# Patient Record
Sex: FEMALE | Race: WHITE | NOT HISPANIC OR LATINO | Employment: FULL TIME | URBAN - METROPOLITAN AREA
[De-identification: names, ages, dates, MRNs, and addresses within clinical notes are randomized per-mention and may not be internally consistent; named-entity substitution may affect disease eponyms.]

---

## 2019-03-28 ENCOUNTER — TRANSFERRED RECORDS (OUTPATIENT)
Dept: HEALTH INFORMATION MANAGEMENT | Facility: CLINIC | Age: 39
End: 2019-03-28

## 2019-03-28 ENCOUNTER — PATIENT OUTREACH (OUTPATIENT)
Dept: PLASTIC SURGERY | Facility: CLINIC | Age: 39
End: 2019-03-28

## 2019-03-28 DIAGNOSIS — F64.0 GENDER DYSPHORIA IN ADULT: Primary | ICD-10-CM

## 2019-03-28 NOTE — PROGRESS NOTES
[facility]:  Care Coordination Note     SITUATION   Patient is a 38 year old who is receiving support for:  Clinic Care Coordination - Initial (New Patient requesting vaginoplasty)  .    BACKGROUND     New pt requesting vaginoplasty consultation with Dr. Andersen. Pt currently working with [facility] team, but would like a second opinion regarding surgical outcomes.     ASSESSMENT     Surgery              [facility] Assessment  [facility] ([facility]) Enrollment: Enrolled(Hormones for 3 years prescribed by Dr. Awad at [facility]. )  Patient has a therapist: Yes  Name of therapist: Dagmar Ruiz in [email or web address]  Letter of support #1: Requested  Letter of support #2: Requested(Aime Matamoros at [facility])  Surgery being considered: Yes  Vaginoplasty: Yes    Pt reports no smoking, BMI at 31.3, pt reports working on weight loss.       PLAN          Nursing Interventions:   [facility] program and services discussed with patient. [facility] referral placed. [facility] assessment completed. Process for accessing surgery discussed including: WPATH standards of care, Letters of support, [place] insurance process, surgery scheduling, and approximate timeline. Pt questions answered. Registration completed & reviewed; scheduling process discussed & completed, as necessary.     More than 50% of the time was used to educate patient on medical & surgical process.     Follow-up plan:  Pt to obtain two letters of support from mental health providers and fax to Dr. Candido Andersen's office. Fax # was provided. Pt to sign up for [employer]. Once two letters have been received, pt will be called and scheduled for lower surgery consult.          Adolfo Xie

## 2019-03-29 ENCOUNTER — PATIENT OUTREACH (OUTPATIENT)
Dept: PLASTIC SURGERY | Facility: CLINIC | Age: 39
End: 2019-03-29

## 2019-03-29 NOTE — PROGRESS NOTES
[facility]:  Care Coordination Note     SITUATION   Patient is a 38 year old who is receiving support for:  Clinic Care Coordination - follow up   BACKGROUND      New pt requesting vaginoplasty consultation with Dr. Andersen. Pt currently working with [facility] team, but would like a second opinion regarding surgical outcomes.      ASSESSMENT      Surgery              CGC Assessment  [facility] ([facility]) Enrollment: Enrolled(Hormones for 3 years prescribed by Dr. Awad at [facility]. )  Patient has a therapist: Yes  Name of therapist: Dagmar Ruiz in [employer]  Letter of support #1: Received 3/29/19  Letter of support #2: Requested(Aime Matamoros at [facility])  Surgery being considered: Yes  Vaginoplasty: Yes     Pt reports no smoking, BMI at 31.3, pt reports working on weight loss.         PLAN                    Nursing Interventions:   1st letter of support scanned to chart and routed to trans care coordinator for review.    Follow-up plan:  Will schedule consultation after 2nd letter of support received and approved.

## 2024-04-22 ENCOUNTER — OFFICE VISIT (OUTPATIENT)
Dept: URGENT CARE | Facility: URGENT CARE | Age: 44
End: 2024-04-22
Payer: COMMERCIAL

## 2024-04-22 VITALS
WEIGHT: 215 LBS | TEMPERATURE: 101.4 F | HEART RATE: 128 BPM | OXYGEN SATURATION: 96 % | SYSTOLIC BLOOD PRESSURE: 131 MMHG | DIASTOLIC BLOOD PRESSURE: 87 MMHG

## 2024-04-22 DIAGNOSIS — R11.2 NAUSEA AND VOMITING, UNSPECIFIED VOMITING TYPE: Primary | ICD-10-CM

## 2024-04-22 PROCEDURE — 99203 OFFICE O/P NEW LOW 30 MIN: CPT | Performed by: NURSE PRACTITIONER

## 2024-04-22 RX ORDER — ONDANSETRON 4 MG/1
4 TABLET, ORALLY DISINTEGRATING ORAL EVERY 8 HOURS PRN
Qty: 20 TABLET | Refills: 0 | Status: SHIPPED | OUTPATIENT
Start: 2024-04-22

## 2024-04-22 RX ORDER — ESTRADIOL 2 MG/1
1 TABLET ORAL DAILY
COMMUNITY

## 2024-04-22 RX ORDER — ESCITALOPRAM OXALATE 10 MG/1
15 TABLET ORAL DAILY
COMMUNITY

## 2024-04-22 NOTE — PATIENT INSTRUCTIONS
Take zofran every 8 hours as needed for nausea  Small sips of fluids often.    May take imodium as needed for diarrhea  Try the BRAT diet.    Follow up if the diarrhea persists or worsens in the [facility] room or ER for IV fluids.

## 2024-09-23 ENCOUNTER — APPOINTMENT (OUTPATIENT)
Dept: GENERAL RADIOLOGY | Facility: CLINIC | Age: 44
End: 2024-09-23
Attending: EMERGENCY MEDICINE
Payer: COMMERCIAL

## 2024-09-23 ENCOUNTER — HOSPITAL ENCOUNTER (EMERGENCY)
Facility: CLINIC | Age: 44
Discharge: HOME | End: 2024-09-23
Attending: EMERGENCY MEDICINE | Admitting: EMERGENCY MEDICINE
Payer: COMMERCIAL

## 2024-09-23 VITALS
TEMPERATURE: 97.4 F | HEIGHT: 66 IN | HEART RATE: 66 BPM | SYSTOLIC BLOOD PRESSURE: 131 MMHG | WEIGHT: 215 LBS | DIASTOLIC BLOOD PRESSURE: 93 MMHG | OXYGEN SATURATION: 99 % | RESPIRATION RATE: 17 BRPM | BODY MASS INDEX: 34.55 KG/M2

## 2024-09-23 DIAGNOSIS — R07.9 CHEST PAIN, UNSPECIFIED TYPE: ICD-10-CM

## 2024-09-23 LAB
ALBUMIN SERPL BCG-MCNC: 4.1 G/DL (ref 3.5–5.2)
ALP SERPL-CCNC: 60 U/L (ref 40–150)
ALT SERPL W P-5'-P-CCNC: 39 U/L (ref 0–70)
ANION GAP SERPL CALCULATED.3IONS-SCNC: 11 MMOL/L (ref 7–15)
AST SERPL W P-5'-P-CCNC: 20 U/L (ref 0–45)
ATRIAL RATE - MUSE: 78 BPM
BASOPHILS # BLD AUTO: 0 10E3/UL (ref 0–0.2)
BASOPHILS NFR BLD AUTO: 0 %
BILIRUB DIRECT SERPL-MCNC: <0.2 MG/DL (ref 0–0.3)
BILIRUB SERPL-MCNC: 0.4 MG/DL
BUN SERPL-MCNC: 10.9 MG/DL (ref 6–20)
CALCIUM SERPL-MCNC: 9.4 MG/DL (ref 8.8–10.4)
CHLORIDE SERPL-SCNC: 104 MMOL/L (ref 98–107)
CREAT SERPL-MCNC: 0.74 MG/DL (ref 0.51–1.17)
DIASTOLIC BLOOD PRESSURE - MUSE: NORMAL MMHG
EGFRCR SERPLBLD CKD-EPI 2021: >90 ML/MIN/1.73M2
EOSINOPHIL # BLD AUTO: 0.4 10E3/UL (ref 0–0.7)
EOSINOPHIL NFR BLD AUTO: 6 %
ERYTHROCYTE [DISTWIDTH] IN BLOOD BY AUTOMATED COUNT: 12 % (ref 10–15)
GLUCOSE SERPL-MCNC: 135 MG/DL (ref 70–99)
HCO3 SERPL-SCNC: 24 MMOL/L (ref 22–29)
HCT VFR BLD AUTO: 41.6 % (ref 35–53)
HGB BLD-MCNC: 14.8 G/DL (ref 11.7–17.7)
IMM GRANULOCYTES # BLD: 0 10E3/UL
IMM GRANULOCYTES NFR BLD: 0 %
INTERPRETATION ECG - MUSE: NORMAL
LIPASE SERPL-CCNC: 27 U/L (ref 13–60)
LYMPHOCYTES # BLD AUTO: 2.4 10E3/UL (ref 0.8–5.3)
LYMPHOCYTES NFR BLD AUTO: 35 %
MCH RBC QN AUTO: 32.6 PG (ref 26.5–33)
MCHC RBC AUTO-ENTMCNC: 35.6 G/DL (ref 31.5–36.5)
MCV RBC AUTO: 92 FL (ref 78–100)
MONOCYTES # BLD AUTO: 0.5 10E3/UL (ref 0–1.3)
MONOCYTES NFR BLD AUTO: 7 %
NEUTROPHILS # BLD AUTO: 3.5 10E3/UL (ref 1.6–8.3)
NEUTROPHILS NFR BLD AUTO: 51 %
NRBC # BLD AUTO: 0 10E3/UL
NRBC BLD AUTO-RTO: 0 /100
P AXIS - MUSE: 51 DEGREES
PLATELET # BLD AUTO: 179 10E3/UL (ref 150–450)
POTASSIUM SERPL-SCNC: 3.8 MMOL/L (ref 3.4–5.3)
PR INTERVAL - MUSE: 162 MS
PROT SERPL-MCNC: 6.3 G/DL (ref 6.4–8.3)
QRS DURATION - MUSE: 82 MS
QT - MUSE: 378 MS
QTC - MUSE: 430 MS
R AXIS - MUSE: 73 DEGREES
RBC # BLD AUTO: 4.54 10E6/UL (ref 3.8–5.9)
SODIUM SERPL-SCNC: 139 MMOL/L (ref 135–145)
SYSTOLIC BLOOD PRESSURE - MUSE: NORMAL MMHG
T AXIS - MUSE: 50 DEGREES
TROPONIN T SERPL HS-MCNC: <6 NG/L
VENTRICULAR RATE- MUSE: 78 BPM
WBC # BLD AUTO: 6.9 10E3/UL (ref 4–11)

## 2024-09-23 PROCEDURE — 80053 COMPREHEN METABOLIC PANEL: CPT | Performed by: EMERGENCY MEDICINE

## 2024-09-23 PROCEDURE — 80048 BASIC METABOLIC PNL TOTAL CA: CPT | Performed by: EMERGENCY MEDICINE

## 2024-09-23 PROCEDURE — 85025 COMPLETE CBC W/AUTO DIFF WBC: CPT | Performed by: EMERGENCY MEDICINE

## 2024-09-23 PROCEDURE — 99285 EMERGENCY DEPT VISIT HI MDM: CPT | Mod: 25

## 2024-09-23 PROCEDURE — 71046 X-RAY EXAM CHEST 2 VIEWS: CPT

## 2024-09-23 PROCEDURE — 36415 COLL VENOUS BLD VENIPUNCTURE: CPT | Performed by: EMERGENCY MEDICINE

## 2024-09-23 PROCEDURE — 83690 ASSAY OF LIPASE: CPT | Performed by: EMERGENCY MEDICINE

## 2024-09-23 PROCEDURE — 93005 ELECTROCARDIOGRAM TRACING: CPT

## 2024-09-23 PROCEDURE — 82248 BILIRUBIN DIRECT: CPT | Performed by: EMERGENCY MEDICINE

## 2024-09-23 PROCEDURE — 84484 ASSAY OF TROPONIN QUANT: CPT | Performed by: EMERGENCY MEDICINE

## 2024-09-23 ASSESSMENT — ACTIVITIES OF DAILY LIVING (ADL)
ADLS_ACUITY_SCORE: 35

## 2024-09-23 ASSESSMENT — COLUMBIA-SUICIDE SEVERITY RATING SCALE - C-SSRS
6. HAVE YOU EVER DONE ANYTHING, STARTED TO DO ANYTHING, OR PREPARED TO DO ANYTHING TO END YOUR LIFE?: NO
1. IN THE PAST MONTH, HAVE YOU WISHED YOU WERE DEAD OR WISHED YOU COULD GO TO SLEEP AND NOT WAKE UP?: NO
2. HAVE YOU ACTUALLY HAD ANY THOUGHTS OF KILLING YOURSELF IN THE PAST MONTH?: NO

## 2024-09-23 NOTE — ED PROVIDER NOTES
"  Emergency Department Note      History of Present Illness     Chief Complaint   Chest Pain      HPI   Sepideh Mckeon is a 44 year old adult who presents with chest pain. Last night around 2230 the patient developed dull, central chest pain while sitting in bed along with slight light-headedness. Pain felt similar to indigestion which they get intermittently as it radiated into the upper abdomen. No nausea, shortness of breath, fever, cough, leg swelling or pain.  No recent long travel or surgeries. No History of DVT/PE or ACS. Also feels an intermittent warm sensation on arms and legs.    Independent Historian   None    Past Medical History     Medical History and Problem List   Depression   CRISTIANE on CPAP  Gender dysphoria   Anxiety     Medications   Lexapro   Estradiol     Surgical History   [place] teeth extraction   Vaginoplasty     Physical Exam     Patient Vitals for the past 24 hrs:   BP Temp Temp src Pulse Resp SpO2 Height Weight   09/23/24 0118 (!) 156/99 97.4  F (36.3  C) Oral 78 18 98 % 1.676 m (5' 6\") 97.5 kg (215 lb)     Physical Exam  General: Sitting up in bed  Eyes:  The pupils are equal and round    Conjunctivae and sclerae are normal  ENT:    Atraumatic face  Neck:  Normal range of motion  CV:  Regular rate, regular rhythm     Skin warm and well perfused   Resp:  Non labored breathing on room air    No tachypnea    No cough heard    Lungs clear bilaterally  GI:  Abdomen is soft, there is no rigidity    No distension    No rebound tenderness     No abdominal tenderness  MS:  Normal muscular tone  Skin:  No rash or acute skin lesions noted  Neuro:   Awake, alert.      Speech is normal and fluent.    Face is symmetric.     Moves all extremities equally    SILT on bilateral UE/LE  Psych: Normal affect.  Appropriate interactions.    Diagnostics     Lab Results   Labs Ordered and Resulted from Time of ED Arrival to Time of ED Departure   BASIC METABOLIC PANEL - Abnormal       Result Value    Sodium 139   "    Potassium 3.8      Chloride 104      Carbon Dioxide (CO2) 24      Anion Gap 11      Urea Nitrogen 10.9      Creatinine 0.74      GFR Estimate >90      Calcium 9.4      Glucose 135 (*)    HEPATIC FUNCTION PANEL - Abnormal    Protein Total 6.3 (*)     Albumin 4.1      Bilirubin Total 0.4      Alkaline Phosphatase 60      AST 20      ALT 39      Bilirubin Direct <0.20     TROPONIN T, HIGH SENSITIVITY - Normal    Troponin T, High Sensitivity <6     LIPASE - Normal    Lipase 27     CBC WITH PLATELETS AND DIFFERENTIAL    WBC Count 6.9      RBC Count 4.54      Hemoglobin 14.8      Hematocrit 41.6      MCV 92      MCH 32.6      MCHC 35.6      RDW 12.0      Platelet Count 179      % Neutrophils 51      % Lymphocytes 35      % Monocytes 7      % Eosinophils 6      % Basophils 0      % Immature Granulocytes 0      NRBCs per 100 WBC 0      Absolute Neutrophils 3.5      Absolute Lymphocytes 2.4      Absolute Monocytes 0.5      Absolute Eosinophils 0.4      Absolute Basophils 0.0      Absolute Immature Granulocytes 0.0      Absolute NRBCs 0.0       Imaging   XR Chest 2 Views   Final Result   IMPRESSION: Negative chest.          EKG   ECG taken at 0117, ECG read at 0126  Normal sinus rhythm   Normal ECG  Rate 78 bpm. AL interval 162 ms. QRS duration 82 ms. QT/QTc 378/430 ms. P-R-T axes 51 73 50.    Independent Interpretation   CXR: No pneumothorax.    ED Course      Discussion of Management   None    ED Course   ED Course as of 09/23/24 0427   Mon Sep 23, 2024   0204 I initially assessed the patient and obtained the above history and physical exam.    0312 I rechecked the patient        Additional Documentation  None    Medical Decision Making / Diagnosis     JASS Mckeon is a 44 year old adult who presents with chest pain.  The work up in the Emergency Department is negative.  The differential diagnosis of chest pain is broad and includes life threatening etiologies such as Acute coronary syndrome, Myocardial  infarction, Pulmonary Embolism, and Acute Aortic Dissection.  Other causes may include pneumonia, pneumothorax, pericarditis, pleurisy, esophageal spasm, biliary colic, pancreatitis.  No serious etiology for the chest pain was detected today during this visit.  Doubt PE based on symptoms and do not think further workup or testing is indicated emergency department for this.  Doubt dissection with narrow mediastinum on chest x-ray, pain resolved in the emergency department and no radiation to the back.  Patient's discomfort was resolved in the emergency department.  Troponin is undetectable and do not think second troponin is indicated.  Chest x-ray is unremarkable.    Close follow up with primary care is indicated should the pain continue, as further work up may be performed.    Disposition   The patient was discharged.     Diagnosis     ICD-10-CM    1. Chest pain, unspecified type  R07.9          Scribe Disclosure:  I, Timbo Zepeda, am serving as a scribe at 2:09 AM on 9/23/2024 to document services personally performed by Laura Franco MD based on my observations and the provider's statements to me.        Laura Franco MD  09/23/24 5100

## 2024-09-23 NOTE — ED TRIAGE NOTES
"Pt presents to triage with C/O chest discomfort, lightheadedness, and her extremities feel \"warm\". Denies cardiac hx. Onset of symptoms 2230 this evening.      Triage Assessment (Adult)       Row Name 09/23/24 0120          Triage Assessment    Airway WDL WDL        Respiratory WDL    Respiratory WDL WDL        Skin Circulation/Temperature WDL    Skin Circulation/Temperature WDL WDL        Cardiac WDL    Cardiac WDL X;chest pain        Chest Pain Assessment    Chest Pain Location midsternal        Peripheral/Neurovascular WDL    Peripheral Neurovascular WDL WDL        Cognitive/Neuro/Behavioral WDL    Cognitive/Neuro/Behavioral WDL WDL                     "

## 2024-12-01 ENCOUNTER — HEALTH MAINTENANCE LETTER (OUTPATIENT)
Age: 44
End: 2024-12-01